# Patient Record
Sex: FEMALE | Race: WHITE | NOT HISPANIC OR LATINO | Employment: UNEMPLOYED | URBAN - NONMETROPOLITAN AREA
[De-identification: names, ages, dates, MRNs, and addresses within clinical notes are randomized per-mention and may not be internally consistent; named-entity substitution may affect disease eponyms.]

---

## 2021-01-09 ENCOUNTER — HOSPITAL ENCOUNTER (EMERGENCY)
Facility: HOSPITAL | Age: 26
Discharge: HOME | End: 2021-01-10
Attending: EMERGENCY MEDICINE | Admitting: EMERGENCY MEDICINE
Payer: COMMERCIAL

## 2021-01-09 DIAGNOSIS — R45.851 SUICIDAL IDEATION: ICD-10-CM

## 2021-01-09 DIAGNOSIS — F39 MOOD DISORDER (HCC): Primary | ICD-10-CM

## 2021-01-09 LAB
ALBUMIN SERPL BCP-MCNC: 4.1 G/DL (ref 3.5–5)
ALP SERPL-CCNC: 64 U/L (ref 46–116)
ALT SERPL W P-5'-P-CCNC: 20 U/L (ref 12–78)
AMPHETAMINES SERPL QL SCN: NEGATIVE
ANION GAP SERPL CALCULATED.3IONS-SCNC: 8 MMOL/L (ref 4–13)
AST SERPL W P-5'-P-CCNC: 14 U/L (ref 5–45)
BARBITURATES UR QL: NEGATIVE
BASOPHILS # BLD AUTO: 0.05 THOUSANDS/ΜL (ref 0–0.1)
BASOPHILS NFR BLD AUTO: 1 % (ref 0–1)
BENZODIAZ UR QL: NEGATIVE
BILIRUB SERPL-MCNC: 0.22 MG/DL (ref 0.2–1)
BUN SERPL-MCNC: 21 MG/DL (ref 5–25)
CALCIUM SERPL-MCNC: 8.8 MG/DL (ref 8.3–10.1)
CHLORIDE SERPL-SCNC: 103 MMOL/L (ref 100–108)
CO2 SERPL-SCNC: 27 MMOL/L (ref 21–32)
COCAINE UR QL: NEGATIVE
CREAT SERPL-MCNC: 0.84 MG/DL (ref 0.6–1.3)
EOSINOPHIL # BLD AUTO: 0.15 THOUSAND/ΜL (ref 0–0.61)
EOSINOPHIL NFR BLD AUTO: 2 % (ref 0–6)
ERYTHROCYTE [DISTWIDTH] IN BLOOD BY AUTOMATED COUNT: 13 % (ref 11.6–15.1)
ETHANOL SERPL-MCNC: <3 MG/DL (ref 0–3)
EXT PREG TEST URINE: NEGATIVE
EXT. CONTROL ED NAV: NORMAL
GFR SERPL CREATININE-BSD FRML MDRD: 96 ML/MIN/1.73SQ M
GLUCOSE SERPL-MCNC: 90 MG/DL (ref 65–140)
HCT VFR BLD AUTO: 39.8 % (ref 34.8–46.1)
HGB BLD-MCNC: 13 G/DL (ref 11.5–15.4)
IMM GRANULOCYTES # BLD AUTO: 0.02 THOUSAND/UL (ref 0–0.2)
IMM GRANULOCYTES NFR BLD AUTO: 0 % (ref 0–2)
LYMPHOCYTES # BLD AUTO: 2.3 THOUSANDS/ΜL (ref 0.6–4.47)
LYMPHOCYTES NFR BLD AUTO: 31 % (ref 14–44)
MCH RBC QN AUTO: 28.8 PG (ref 26.8–34.3)
MCHC RBC AUTO-ENTMCNC: 32.7 G/DL (ref 31.4–37.4)
MCV RBC AUTO: 88 FL (ref 82–98)
METHADONE UR QL: NEGATIVE
MONOCYTES # BLD AUTO: 0.6 THOUSAND/ΜL (ref 0.17–1.22)
MONOCYTES NFR BLD AUTO: 8 % (ref 4–12)
NEUTROPHILS # BLD AUTO: 4.21 THOUSANDS/ΜL (ref 1.85–7.62)
NEUTS SEG NFR BLD AUTO: 58 % (ref 43–75)
NRBC BLD AUTO-RTO: 0 /100 WBCS
OPIATES UR QL SCN: NEGATIVE
OXYCODONE+OXYMORPHONE UR QL SCN: NEGATIVE
PCP UR QL: NEGATIVE
PLATELET # BLD AUTO: 255 THOUSANDS/UL (ref 149–390)
PMV BLD AUTO: 9.6 FL (ref 8.9–12.7)
POTASSIUM SERPL-SCNC: 4.2 MMOL/L (ref 3.5–5.3)
PROT SERPL-MCNC: 8.1 G/DL (ref 6.4–8.2)
RBC # BLD AUTO: 4.51 MILLION/UL (ref 3.81–5.12)
SODIUM SERPL-SCNC: 138 MMOL/L (ref 136–145)
THC UR QL: NEGATIVE
TSH SERPL DL<=0.05 MIU/L-ACNC: 1.37 UIU/ML (ref 0.36–3.74)
WBC # BLD AUTO: 7.33 THOUSAND/UL (ref 4.31–10.16)

## 2021-01-09 PROCEDURE — 81025 URINE PREGNANCY TEST: CPT | Performed by: EMERGENCY MEDICINE

## 2021-01-09 PROCEDURE — 84443 ASSAY THYROID STIM HORMONE: CPT | Performed by: EMERGENCY MEDICINE

## 2021-01-09 PROCEDURE — 99285 EMERGENCY DEPT VISIT HI MDM: CPT

## 2021-01-09 PROCEDURE — 85025 COMPLETE CBC W/AUTO DIFF WBC: CPT | Performed by: EMERGENCY MEDICINE

## 2021-01-09 PROCEDURE — 36415 COLL VENOUS BLD VENIPUNCTURE: CPT | Performed by: EMERGENCY MEDICINE

## 2021-01-09 PROCEDURE — 80053 COMPREHEN METABOLIC PANEL: CPT | Performed by: EMERGENCY MEDICINE

## 2021-01-09 PROCEDURE — 80307 DRUG TEST PRSMV CHEM ANLYZR: CPT | Performed by: EMERGENCY MEDICINE

## 2021-01-09 PROCEDURE — 82077 ASSAY SPEC XCP UR&BREATH IA: CPT | Performed by: EMERGENCY MEDICINE

## 2021-01-09 RX ORDER — OLANZAPINE 10 MG/1
10 TABLET, ORALLY DISINTEGRATING ORAL ONCE
Status: DISCONTINUED | OUTPATIENT
Start: 2021-01-09 | End: 2021-01-10 | Stop reason: HOSPADM

## 2021-01-09 RX ORDER — SERTRALINE HYDROCHLORIDE 100 MG/1
100 TABLET, FILM COATED ORAL DAILY
COMMUNITY

## 2021-01-10 VITALS
HEIGHT: 68 IN | WEIGHT: 161.82 LBS | SYSTOLIC BLOOD PRESSURE: 110 MMHG | DIASTOLIC BLOOD PRESSURE: 68 MMHG | RESPIRATION RATE: 18 BRPM | HEART RATE: 87 BPM | TEMPERATURE: 98 F | BODY MASS INDEX: 24.52 KG/M2 | OXYGEN SATURATION: 95 %

## 2021-01-10 LAB
FLUAV RNA RESP QL NAA+PROBE: NEGATIVE
FLUBV RNA RESP QL NAA+PROBE: NEGATIVE
RSV RNA RESP QL NAA+PROBE: NEGATIVE
SARS-COV-2 RNA RESP QL NAA+PROBE: NEGATIVE

## 2021-01-10 PROCEDURE — 99285 EMERGENCY DEPT VISIT HI MDM: CPT | Performed by: EMERGENCY MEDICINE

## 2021-01-10 PROCEDURE — 99244 OFF/OP CNSLTJ NEW/EST MOD 40: CPT | Performed by: PSYCHIATRY & NEUROLOGY

## 2021-01-10 PROCEDURE — 0241U HB NFCT DS VIR RESP RNA 4 TRGT: CPT | Performed by: EMERGENCY MEDICINE

## 2021-01-10 RX ORDER — ARIPIPRAZOLE 2 MG/1
2 TABLET ORAL ONCE
Status: COMPLETED | OUTPATIENT
Start: 2021-01-10 | End: 2021-01-10

## 2021-01-10 RX ORDER — ARIPIPRAZOLE 2 MG/1
2 TABLET ORAL DAILY
Qty: 30 TABLET | Refills: 0 | Status: SHIPPED | OUTPATIENT
Start: 2021-01-10 | End: 2021-02-09

## 2021-01-10 RX ADMIN — ARIPIPRAZOLE 2 MG: 2 TABLET ORAL at 21:02

## 2021-01-10 NOTE — ED NOTES
Patient was evaluated by crisis earlier today and recommended inpatient tx  Crisis worker to send paperwork momentarily        Gretchen Phillips RN  01/09/21 9400

## 2021-01-10 NOTE — ED NOTES
Patient refused Zyprexa  States was feeling anxious while in the hallway but feeling much better now        Lynne Beatty RN  01/09/21 7727

## 2021-01-10 NOTE — ED NOTES
No in-network beds    Bed Search     [place] - No beds  [place] - No beds  [facility]) - Not accepting outside referrals  [facility] - No beds  [address] [facility] - Not accepting pts outside the [facility] - No beds  [facility] Not accepting outside Referrals  [place] - No beds  [facility] - [facility] Beds  [facility] - [facility]  [facility] No beds   [address] beds  [facility] - No beds  [facility] - No beds  [place] - No beds  [facility] - No beds  [facility] - No beds  [facility] - No beds  [place] - No beds  [place] - No beds  [place] - No beds  [place] - No beds

## 2021-01-10 NOTE — ED NOTES
Pt states that she has infant at home and feels that she may need to pump/express  I informed her we do not have any pumps here for her to use, told her that mom can go home to get pump or family member can bring one here  Pt's father is going to bring her pump here and will call pt's mom when he arrives       Leslie Brooks  01/10/21 0013

## 2021-01-10 NOTE — ED NOTES
Patient sleeping with mom at bedside  No respiratory distress noted  Patient has call bell within reach, siderails up x1        Fiorella Harrsi, RN  01/10/21 2410

## 2021-01-10 NOTE — ED NOTES
Father called - concerned about length of time pt remains in ED w/o the help she needs    Informed of process and reassured that pt is being cared for and bed search continues     Renée Dangelo RN  01/10/21 9291

## 2021-01-10 NOTE — ED NOTES
Pt in ED 09  Room broken down, pt in paper scrubs, mother at bedside  Pt is cooperative and pleasant at this time  Pt's belongings in belonging bag in locker #32  [facility] taking over 1:1 continual observation at this time       Esteban Brooks  01/09/21 2136       Whitney Brooks  01/09/21 2137

## 2021-01-10 NOTE — ED NOTES
Offered pt and mother water and pretzels  Both are extremely pleasant, pt is cooperative and not requesting anything more at this time       Edin Brooks  01/10/21 0038

## 2021-01-10 NOTE — ED NOTES
Pt awake, quiet, tearful at times, states feels anxious but is feeling better with her mother at bedside       Rena Parker RN  01/10/21 6654

## 2021-01-10 NOTE — ED CARE HANDOFF
Emergency Department Sign Out Note        Sign out and transfer of care from Dr Bettye Canela  See Separate Emergency Department note  The patient, Tuan Solitario, was evaluated by the previous provider for SI  Workup Completed:  Negative medical w/u including covid test     ED Course / Workup Pending (followup):  25 F  Hx of depression  SI  201 signed  Bed search pending  ED Course as of Praneeth 10 1908   Kevin Wright Praneeth 10, 2021   1755 Was called to the patient's room because she and her mother are frustrated with the care that the patient is receiving in the ED  They are requesting discharge being that the patient is no longer having suicidal ideations  She also denies homicidal ideations, hallucinations  They added that no one (doctor or nurse) has been in the room all day to check up on her  Will contact Psychiatry and Crisis for guidance regarding this matter  0 Spoke with a Psychiatrist from [address] [place] [address] who recommended that the patient be evaluated by a psychiatrist (telemedicine) prior to her being discharged  Will set up consult  Procedures  MDM  Number of Diagnoses or Management Options  Suicidal ideation:   Diagnosis management comments: 31year old F  SI  Signed 201  Patient stated that she was no longer having suicidal ideations  Is able to contract for safety  Case discussed with Psychiatry who recommended Psychiatry evaluation prior to discharge  Psychiatry telemedicine consult is pending  Patient signed out to Dr Bettye Canela  Plan discussed  The patient was stable while under my care       Disposition  Final diagnoses:   Suicidal ideation     Time reflects when diagnosis was documented in both MDM as applicable and the Disposition within this note     Time User Action Codes Description Comment    1/9/2021 11:51 PM Bhanu Garcia Add [X94 [id number]] Suicidal ideation       ED Disposition     ED Disposition Condition Date/Time Comment    Transfer to [facility] Jan 9, 2021 11:51 PM Tuan Solitario should be transferred out to  and has been medically cleared  Follow-up Information    None       Patient's Medications   Discharge Prescriptions    No medications on file     No discharge procedures on file         ED Provider  Electronically Signed by     Benna Dakins, DO  01/10/21 2551

## 2021-01-10 NOTE — ED NOTES
Fidelina Flow from [address] [facility] [address] Intake returned call - stated a request for bed search or paper work was never received    Will fax info and necessary paperwork      Brionna Schafer RN  01/10/21 7971

## 2021-01-10 NOTE — ED NOTES
intake called for Update - no one available to answer - message left to return call     Farzaneh Fischer RN  01/10/21 0322

## 2021-01-11 NOTE — DISCHARGE INSTRUCTIONS
You were evaluated in the ED  You are being prescribed Abilify  Please take as directed along with all your other prescribed medications  Do not hesitate to return to the emergency department for any concerning signs or symptoms

## 2021-01-11 NOTE — ED PROVIDER NOTES
History  Chief Complaint   Patient presents with    Suicidal     SI "bad since last night", increasing thoughts over the last week  no plan  62-year-old female with past medical history of major depressive disorder presents to with depression and suicidal ideation  Patient states that she started experiencing suicidal ideation approximately 48 hours ago, denies any plan, denies any attempt at this time  Patient denies any substance issues, has been taking her sertraline as prescribed but states that increased pressures from family and moving has resulted in increased depressive symptoms  Suicidal  Presenting symptoms: depression and suicidal thoughts    Presenting symptoms: no hallucinations, no homicidal ideas, no paranoid behavior, no self-mutilation, no suicidal threats and no suicide attempt    Degree of incapacity (severity): Moderate  Onset quality:  Sudden  Timing:  Constant  Progression:  Worsening  Chronicity:  New  Relieved by:  Nothing  Worsened by:  Lack of sleep  Ineffective treatments:  Antidepressants  Associated symptoms: anhedonia    Associated symptoms: no abdominal pain, no anxiety, no appetite change and no chest pain    Risk factors: family hx of mental illness and hx of mental illness        Prior to Admission Medications   Prescriptions Last Dose Informant Patient Reported? Taking?   sertraline (ZOLOFT) 100 mg tablet 1/9/2021 at Unknown time  Yes Yes   Sig: Take 100 mg by mouth daily      Facility-Administered Medications: None       History reviewed  No pertinent past medical history  History reviewed  No pertinent surgical history  History reviewed  No pertinent family history  I have reviewed and agree with the history as documented      E-Cigarette/Vaping    E-Cigarette Use Never User      E-Cigarette/Vaping Substances    Nicotine No     THC No     CBD No     Flavoring No     Other No     Unknown No      Social History     Tobacco Use    Smoking status: Never Smoker    Smokeless tobacco: Never Used   Substance Use Topics    Alcohol use: Yes     Frequency: 2-4 times a month    Drug use: Not Currently       Review of Systems   Constitutional: Negative for appetite change  Respiratory: Negative for shortness of breath  Cardiovascular: Negative for chest pain  Gastrointestinal: Negative for abdominal pain  Psychiatric/Behavioral: Positive for suicidal ideas  Negative for hallucinations, homicidal ideas, paranoia and self-injury  The patient is not nervous/anxious  All other systems reviewed and are negative  Physical Exam  Physical Exam  Vitals signs and nursing note reviewed  Constitutional:       General: She is not in acute distress  Appearance: She is well-developed  HENT:      Head: Normocephalic and atraumatic  Right Ear: External ear normal       Left Ear: External ear normal    Eyes:      Conjunctiva/sclera: Conjunctivae normal       Pupils: Pupils are equal, round, and reactive to light  Neck:      Musculoskeletal: Normal range of motion and neck supple  Cardiovascular:      Rate and Rhythm: Normal rate and regular rhythm  Heart sounds: Normal heart sounds  No murmur  Pulmonary:      Effort: Pulmonary effort is normal       Breath sounds: Normal breath sounds  No wheezing or rales  Abdominal:      General: Bowel sounds are normal  There is no distension  Palpations: Abdomen is soft  Tenderness: There is no abdominal tenderness  There is no guarding or rebound  Musculoskeletal: Normal range of motion  General: No deformity  Skin:     General: Skin is warm and dry  Findings: No rash  Neurological:      General: No focal deficit present  Mental Status: She is alert and oriented to person, place, and time  Cranial Nerves: No cranial nerve deficit     Psychiatric:         Behavior: Behavior normal       Comments: Positive depressed         Vital Signs  ED Triage Vitals   Temperature Pulse Respirations Blood Pressure SpO2   01/09/21 2019 01/09/21 2019 01/09/21 2019 01/09/21 2019 01/09/21 2019   97 5 °F (36 4 °C) 89 20 114/72 97 %      Temp Source Heart Rate Source Patient Position - Orthostatic VS BP Location FiO2 (%)   01/09/21 2019 01/10/21 0010 01/09/21 2019 01/09/21 2019 --   Temporal Monitor Sitting Right arm       Pain Score       01/09/21 2019       No Pain           Vitals:    01/10/21 0010 01/10/21 0635 01/10/21 1030 01/10/21 2020   BP: 108/76 109/73 107/77 110/68   Pulse: 82 91 80 87   Patient Position - Orthostatic VS: Sitting Sitting Sitting Sitting         Visual Acuity      ED Medications  Medications   ARIPiprazole (ABILIFY) tablet 2 mg (2 mg Oral Given 1/10/21 2102)       Diagnostic Studies  Results Reviewed     Procedure Component Value Units Date/Time    COVID19, Influenza A/B, RSV PCR, [facility] [[id number]]  (Normal) Collected: 01/10/21 0638    Lab Status: Final result Specimen: Nares from Nasopharyngeal Swab Updated: 01/10/21 0728     SARS-CoV-2 Negative     INFLUENZA A PCR Negative     INFLUENZA B PCR Negative     RSV PCR Negative    Narrative: This test has been authorized by FDA under an EUA (Emergency Use Assay) for use by authorized laboratories  Clinical caution and judgement should be used with the interpretation of these results with consideration of the clinical impression and other laboratory testing  Testing reported as "Positive" or "Negative" has been proven to be accurate according to standard laboratory validation requirements  All testing is performed with control materials showing appropriate reactivity at standard intervals  TSH [[id number]]  (Normal) Collected: 01/09/21 2104    Lab Status: Final result Specimen: Blood from Arm, Left Updated: 01/09/21 2141     TSH 3RD GENERATON 1 372 uIU/mL     Narrative:      Patients undergoing fluorescein dye angiography may retain small amounts of fluorescein in the body for 48-72 hours post procedure   Samples containing fluorescein can produce falsely depressed TSH values  If the patient had this procedure,a specimen should be resubmitted post fluorescein clearance  Rapid drug screen, urine [[id number]]  (Normal) Collected: 01/09/21 2104    Lab Status: Final result Specimen: Urine, Clean Catch Updated: 01/09/21 2128     Amph/Meth UR Negative     Barbiturate Ur Negative     Benzodiazepine Urine Negative     Cocaine Urine Negative     Methadone Urine Negative     Opiate Urine Negative     PCP Ur Negative     THC Urine Negative     Oxycodone Urine Negative    Narrative:      FOR MEDICAL PURPOSES ONLY  IF CONFIRMATION NEEDED PLEASE CONTACT THE LAB WITHIN 5 DAYS      Drug Screen Cutoff Levels:  AMPHETAMINE/METHAMPHETAMINES  1000 ng/mL  BARBITURATES     200 ng/mL  BENZODIAZEPINES     200 ng/mL  COCAINE      300 ng/mL  METHADONE      300 ng/mL  OPIATES      300 ng/mL  PHENCYCLIDINE     25 ng/mL  THC       50 ng/mL  OXYCODONE      100 ng/mL    Comprehensive metabolic panel [[id number]] Collected: 01/09/21 2104    Lab Status: Final result Specimen: Blood from Arm, Left Updated: 01/09/21 2128     Sodium 138 mmol/L      Potassium 4 2 mmol/L      Chloride 103 mmol/L      CO2 27 mmol/L      ANION GAP 8 mmol/L      BUN 21 mg/dL      Creatinine 0 84 mg/dL      Glucose 90 mg/dL      Calcium 8 8 mg/dL      AST 14 U/L      ALT 20 U/L      Alkaline Phosphatase 64 U/L      Total Protein 8 1 g/dL      Albumin 4 1 g/dL      Total Bilirubin 0 22 mg/dL      eGFR 96 ml/min/1 73sq m     Narrative:      [place] guidelines for Chronic Kidney Disease (CKD):     Stage 1 with normal or high GFR (GFR > 90 mL/min/1 73 square meters)    Stage 2 Mild CKD (GFR = 60-89 mL/min/1 73 square meters)    Stage 3A Moderate CKD (GFR = 45-59 mL/min/1 73 square meters)    Stage 3B Moderate CKD (GFR = 30-44 mL/min/1 73 square meters)    Stage 4 Severe CKD (GFR = 15-29 mL/min/1 73 square meters)    Stage 5 End Stage CKD (GFR <15 mL/min/1 73 square meters)  Note: GFR calculation is accurate only with a steady state creatinine    Ethanol [[id number]]  (Normal) Collected: 01/09/21 2104    Lab Status: Final result Specimen: Blood from Arm, Left Updated: 01/09/21 2128     Ethanol Lvl <3 mg/dL     CBC and differential [[id number]] Collected: 01/09/21 2104    Lab Status: Final result Specimen: Blood from Arm, Left Updated: 01/09/21 2112     WBC 7 33 Thousand/uL      RBC 4 51 Million/uL      Hemoglobin 13 0 g/dL      Hematocrit 39 8 %      MCV 88 fL      MCH 28 8 pg      MCHC 32 7 g/dL      RDW 13 0 %      MPV 9 6 fL      Platelets 084 Thousands/uL      nRBC 0 /100 WBCs      Neutrophils Relative 58 %      Immat GRANS % 0 %      Lymphocytes Relative 31 %      Monocytes Relative 8 %      Eosinophils Relative 2 %      Basophils Relative 1 %      Neutrophils Absolute 4 21 Thousands/µL      Immature Grans Absolute 0 02 Thousand/uL      Lymphocytes Absolute 2 30 Thousands/µL      Monocytes Absolute 0 60 Thousand/µL      Eosinophils Absolute 0 15 Thousand/µL      Basophils Absolute 0 05 Thousands/µL     POCT pregnancy, urine [[id number]]  (Normal) Resulted: 01/09/21 2109    Lab Status: Final result Specimen: Urine Updated: 01/09/21 2109     EXT PREG TEST UR (Ref: Negative) NEGATIVE     Control VALID                 No orders to display              Procedures  Procedures         ED Course  ED Course as of Praneeth 10 2352   Sat Jan 09, 2021   2248 Patient voluntarily wants to seek help  Patient will sign 201  Patient has been medically cleared  2328 201 signed  Bed search initiated                                                MDM  Number of Diagnoses or Management Options  Mood disorder ([facility] ):   Suicidal ideation:   Patient Progress  Patient progress: improved      Disposition  Final diagnoses:   Mood disorder ([facility] [address] )   Suicidal ideation     Time reflects when diagnosis was documented in both MDM as applicable and the Disposition within this note     Time User Action Codes Description Comment    1/9/2021 11:51 PM [place] Comfort Add [W64 [id number]] Suicidal ideation     1/10/2021  7:33 PM Albino Mathewsod [S04 [id number]] Suicidal ideation     1/10/2021  7:33 PM Amy Paget Add [F39] Mood disorder ([address] )     1/10/2021  7:34 PM Amy Paget Add [R93 978] Suicidal ideation       ED Disposition     ED Disposition Condition Date/Time Comment    Discharge Stable Sun Praneeth 10, 2021  7:35 PM Andrew Dubon should be transferred out to  and has been medically cleared  Follow-up Information    None         Discharge Medication List as of 1/10/2021  7:35 PM      START taking these medications    Details   ARIPiprazole (ABILIFY) 2 mg tablet Take 1 tablet (2 mg total) by mouth daily, Starting Sun 1/10/2021, Until Tue 2/9/2021, Normal         CONTINUE these medications which have NOT CHANGED    Details   sertraline (ZOLOFT) 100 mg tablet Take 100 mg by mouth daily, Historical Med           No discharge procedures on file      PDMP Review     None          ED Provider  Electronically Signed by           Gucci Saldivar DO  01/10/21 5643

## 2021-01-11 NOTE — ED NOTES
Tele Psych consult at bedside for pt lorelei Lovell, RN  01/10/21 0660 Elisabeth Garcia, RN  01/10/21 9048
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
